# Patient Record
(demographics unavailable — no encounter records)

---

## 2025-06-04 NOTE — HISTORY OF PRESENT ILLNESS
[FreeTextEntry6] : FU weight and condition, needs vaccines  28 week baby, doing well, on Neosure made to 24 cals per oz, gained 11 0z last 10 days.  Parents say she smiles coos and has eye contact.  Safe basinet.

## 2025-06-04 NOTE — PHYSICAL EXAM
[NL] : warm, clear [FreeTextEntry5] : RR++ [FreeTextEntry8] : RR no murmur [FreeTextEntry9] : soft nl abd, small umb hernia [FreeTextEntry6] : nl [de-identified] : Wadsworth/Ortolani normal, Galeazzi test normal.  Leg length equal, creases symmetrical, no hip click or clunk. No MA or ITT.

## 2025-06-04 NOTE — DISCUSSION/SUMMARY
[FreeTextEntry1] : WEll 2 mos old, 28 week preemie  detailed discussion of vaccines. Parents elect to have all needed vaccines given now. Pentacel LT Prevnar RT Rotateq po given - no GI sx or abns, had NEC, long resolved.  (As per previous discussions with Ped GI, if GI issue resolved OK to give Rotateq).  RTO in one mos.   Anticipatory guidance, safety in detail.  Safe crib, back sleep. No soft bedding, no fluffy items in crib. No swaddling.  Do not overdress.  Pacifier use discussed, start after 1 month old if wanted.  Do not leave baby on adult bed or sofa, where baby might roll off.  No pillows around baby. Prevent falls.  No sick visitors.   Avoid contact with people with cold sores.   Fever = rectal temp 100.4 or more. Call us or come in to office for fever.   No honey until after age 1 year old.  Feeding discussed in detail.  No baby food until age 4 months at least.  Stop Vitamin D and start PVS Fe 1 ml a day. Store in safe place away from children.   Car seat use disc, proper use.  Always keep baby fully buckled when in car seat, whether in car or out of car. Do not raise head of bed. Do not leave baby in swing or baby seat or car seat unobserved.  Care that head cannot fall forward and cause trouble breathing. Take baby out and put on back on flat mattress in crib or bassinet when not directly observed.  Smoke detector, CO detector, FE in kitchen, water temp < 125 F.   [] : The components of the vaccine(s) to be administered today are listed in the plan of care. The disease(s) for which the vaccine(s) are intended to prevent and the risks have been discussed with the caretaker.  The risks are also included in the appropriate vaccination information statements which have been provided to the patient's caregiver.  The caregiver has given consent to vaccinate.

## 2025-06-06 NOTE — HISTORY OF PRESENT ILLNESS
[EDC: ___] : EDC: [unfilled] [Gestational Age: ___] : Gestational Age: [unfilled] [Chronological Age: ___] : Chronological Age: [unfilled] [Corrected Age: ___] : Corrected Age: [unfilled] [Date of D/C: ___] : Date of D/C: [unfilled] [Developmental Pediatrics: ___] : Developmental Pediatrics: [unfilled] [Ophthalmology: ___] : Ophthalmology: [unfilled] [de-identified] : d/c [facility] [place] [de-identified] : Needs high risk and developmental follow up NRE 7, yes EI [de-identified] : none [de-identified] : done (multiple repeats sent)

## 2025-06-06 NOTE — REASON FOR VISIT
[F/U - Hospitalization] : follow-up of a recent hospitalization for [Weight Check] : weight check [Developmental Delay] : developmental delay [Medical Records] : medical records

## 2025-06-06 NOTE — ASSESSMENT
[FreeTextEntry1] : KADIE QUINN  is a __28.1__week gestation infant, now chronologic age 2 months 14 days , corrected age 38.5 seen in  follow-up. Pertinent NICU history includes 28 wks, RDS, PDA treated w/ ibuprofen x2 doses (resolved), NEC IIA, BILL.  The following issues were addressed at this visit.  Growth and nutrition: Weight gain has been  ___ oz/  ____  days and plots at the ____ percentile for corrected age.  Head growth and length are at the ___ and ___ percentiles respectively.  Baby is currently feeding _______  and the plan is to continue ______  until ______ because of _______ . Due to prematurity, solid foods are not recommended until 5-6 months corrected age with good head control. Labs to be obtained today _________ . Continue vitamin supplements.  Development/neuro: baby has developmental delay for chronologic age, was seen by PT/OT today and given home exercises to do. Baby also has  ( list other  neuro abnormalities here). Early Intervention is recommended/is not needed at this time.  Baby will follow-up with pediatric developmental in ______ .   Anemia: Baby has been on iron supplements and will continue/discontinue/increase to ____ . Hct reviewed and is appropriate for age OR  Will check hct/retic today.   CLD: Infant has chronic lung disease. O2 sats ______.  Plan to continue diuril/carospir/ _____ . Oxygen supplementation needed/not needed/adjusted to _____ . Plan to follow with pulmonology. Baby is/is not a candidate for Synagis and will receive next dose  ______ by ______ .   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 Reviewed non-pharmacologic methods to reduce symptoms including _________ .   ROP: Baby is at risk for ROP and other ophthalmologic complications due to prematurity and will follow with ophthalmology _______  . Parents informed of importance of ophtho follow-up.    Inguinal hernia/umbilical hernia observed and easily reducible.  Reviewed signs of  incarceration with parent. Consider delaying inguinal hernia repair beyond 52 weeks corrected age or until off O2. baby to be followed by peds surgery.  OR No need for intervention for umbilical hernia as these usually regress spontaneously.  Breech presentation at birth: Infant is at risk for developmental dysplasia of the the hips. Hip US to be done between 44-46 weeks corrected age.  Script given.  OR Hip US reviewed and is normal.   Other:   Health maintenance: Reviewed routine vaccination schedule with parent as well as guidance for flu vaccine for family, COVID-19 precautions, and need for PMD f/u.  Also discussed bathing and skin care recommendations.   Reviewed notes by (other services)   Next neonatology f/u: _________ .

## 2025-06-06 NOTE — BIRTH HISTORY
[Birthweight ___ kg] : weight [unfilled] kg [Weight ___ kg] : weight [unfilled] kg [Length ___ cm] : length [unfilled] cm [Head Circumference ___ cm] : head circumference [unfilled] cm [Formula: ____] : formula: [unfilled] [de-identified] : 28.1 wk GA female born to a 21 y/o  mother via . NICU called to delivery for prematurity and IUGR. Maternal history of short cervix. Prenatal history of IUGR. Maternal blood type O+. PNL negative, non-reactive, and immune. Hep C negative. GBS pending from 3/28 mother on ancef  penicillin allergy. Mother received betamethasone on 3/28 and 3/29. Mother was also on magnesium. SROM at 21:33 on 3/30, clear fluids. Baby born apneic with low HR, poor tone and poor color. Warmed, dried, stimulated and suction. NICU resuscitation team arrived at 2 MOL. Baby with HR less than 100 so PPV started at approximately 2 MOL. PPV provided until 5 minutes and 30 seconds of life when baby developed spontaneous respirations with maximum settings of 25/5 with 100% FiO2. Baby placed in warming bag and thermal hat. Baby gradually able to be weaned to bCPAP 5 30% for transfer to NICU. Apgars 5/8. [de-identified] : 28 wks, RDS, PDA treated w/ ibuprofen x2 doses (resolved), NEC IIA, BILL

## 2025-07-17 NOTE — HISTORY OF PRESENT ILLNESS
[FreeTextEntry6] : mother 3 mos old, 28 week preemie. Saw kurt gomez in 6 mos.  Mother had schedule appt with  clinic 25. Mother missed appt, was not aware she had one.  Asking about what to do with the baby's formula, still making it 24 cals/0z.  Concerns: Spitting up after feeds. small amts, not forceful, no crying or apparant pain with it.  Feeding Sim Neosure - 80 ml every 2-3 hours. Nights too.  Mixing 2 scoops in 120 ml water.   3-4 stools a day, today only once.

## 2025-07-17 NOTE — END OF VISIT
[Time Spent: ___ minutes] : I have spent [unfilled] minutes of time on the encounter which excludes teaching and separately reported services. Minimal None

## 2025-07-17 NOTE — DISCUSSION/SUMMARY
[FreeTextEntry1] : Well looking baby, good wt gain.  Advised mother to make appt with  clinic. Gave her info.  I will call and try to ask about feeding.   Hep B #1 given.   RTO one mos.  Earler if not improving with spitting up.   mother wants surgical consult now , advised to go in 2-3 mos when baby older and safer to go out. Make appt now for then.   Positional plagiocephaly, discussed safe positioning. Safe sleep etc reviewed.  Baby has loarge umbilic hernia.

## 2025-07-17 NOTE — PHYSICAL EXAM
[NL] : warm, clear [FreeTextEntry1] : NAD alert comfortable [FreeTextEntry2] : NCAT, post mild flattening R occiput [FreeTextEntry5] : RR++ [FreeTextEntry8] : RR no murmur [FreeTextEntry9] : soft NT ND no msas. Large easily reduced umb hernia.  [FreeTextEntry6] : nl [de-identified] : Wadsworth/Ortolani normal, Galeazzi test normal.  Leg length equal, creases symmetrical, no hip click or clunk. No MA or ITT.

## 2025-07-17 NOTE — PHYSICAL EXAM
[NL] : warm, clear [FreeTextEntry1] : NAD alert comfortable [FreeTextEntry2] : NCAT, post mild flattening R occiput [FreeTextEntry5] : RR++ [FreeTextEntry8] : RR no murmur [FreeTextEntry9] : soft NT ND no msas. Large easily reduced umb hernia.  [FreeTextEntry6] : nl [de-identified] : Wadsworth/Ortolani normal, Galeazzi test normal.  Leg length equal, creases symmetrical, no hip click or clunk. No MA or ITT.